# Patient Record
Sex: FEMALE | Race: WHITE
[De-identification: names, ages, dates, MRNs, and addresses within clinical notes are randomized per-mention and may not be internally consistent; named-entity substitution may affect disease eponyms.]

---

## 2020-01-06 ENCOUNTER — HOSPITAL ENCOUNTER (EMERGENCY)
Dept: HOSPITAL 74 - JER | Age: 35
LOS: 1 days | Discharge: HOME | End: 2020-01-07
Payer: COMMERCIAL

## 2020-01-06 VITALS — TEMPERATURE: 98.2 F | DIASTOLIC BLOOD PRESSURE: 76 MMHG | HEART RATE: 88 BPM | SYSTOLIC BLOOD PRESSURE: 117 MMHG

## 2020-01-06 VITALS — BODY MASS INDEX: 27.4 KG/M2
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
## 2020-01-06 DIAGNOSIS — F10.920: Primary | ICD-10-CM

## 2020-01-07 NOTE — PDOC
*Physical Exam





- Vital Signs


 Last Vital Signs











Temp Pulse Resp BP Pulse Ox


 


 98.2 F   88   15   117/76   96 


 


 01/06/20 23:53  01/06/20 23:53  01/06/20 23:53  01/06/20 23:53  01/06/20 23:53














Medical Decision Making





- Medical Decision Making





01/07/20 00:16


Patient seen by the advanced practice provider under my direct supervision. 

Ancillary testing reviewed as necessary.


I agree with plan as outlined by the advanced practice provider.





Discharge





- Discharge Information


Problems reviewed: Yes


Clinical Impression/Diagnosis: 


Alcohol intoxication


Qualifiers:


 Complication of substance-induced condition: uncomplicated Qualified Code(s): 

F10.920 - Alcohol use, unspecified with intoxication, uncomplicated





Condition: Fair


Disposition: HOME





- Additional Discharge Information


Prescriptions: 


Ondansetron [Zofran -] 4 mg PO TID PRN #21 tablet


 PRN Reason: Nausea And/Or Vomiting





- Follow up/Referral





- Patient Discharge Instructions


Patient Printed Discharge Instructions:  [facility] for Alcohol Abuse


Additional Instructions: 


Drink plenty of nonalcoholic fluids.


Avoid alcoholic beverages.


Should you desire detox and/or rehabilitation these go to [address] [place] [address] for treatment.


Return to the emergency department for any new or worsening symptoms.


It was a pleasure taking care of you today.  Thank you very much for choosing 

us to provide your emergent health care needs.





- Post Discharge Activity

## 2020-01-07 NOTE — PDOC
History of Present Illness





- General


Chief Complaint: Alcohol intoxication


Stated Complaint: VOMITING/HEADACHE/NAUSEA


Time Seen by Provider: 01/07/20 00:12


History Source: Patient


Exam Limitations: No Limitations





- History of Present Illness


Initial Comments: 





01/07/20 00:48





HISTORY OF PRESENT ILLNESS: 34-year-old woman past medical history of alcohol 

abuse presents emergency department with alcohol intoxication.  Patient reports 

she had 1 week of alcohol abstinence followed by heavy alcohol consumption 

tonight.  Patient reports feeling nauseous.  Patient endorses multiple life 

stressors that lead to her alcohol use.  She reports multiple detox and rehab 

admissions with poor follow-through as an outpatient.  She denies any chest pain

, dizziness, shortness of breath.





No recent travel or sick contacts. 


PAST MEDICAL HISTORY: Denies past medical history


SURGICAL HISTORY: Denies


ALLERGIES: No known drug allergies





REVIEW OF SYSTEMS


General/Constitutional: Denies fever or chills. Denies weakness, weight change.


HEENT: Denies change in vision. Denies ear pain or discharge. Denies sore 

throat.


Cardiovascular: Denies chest pain or shortness of breath.


Respiratory: Denies cough, wheezing, or hemoptysis.


Gastrointestinal: Denies nausea, vomiting, diarrhea or constipation. Denies 

rectal bleeding.


Genitourinary: Denies dysuria, frequency, or change in urination.


Musculoskeletal: Denies joint or muscle swelling or pain. Denies neck or back 

pain.


Skin and breasts: Denies rash or easy bruising.


Neurologic: Denies headache, vertigo, loss of consciousness, or loss of 

sensation.


Psychiatric: Denies depression or anxiety.


Endocrine: Denies increased thirst. Denies abnormal weight change.


Hematologic/Lymphatic: Denies anemia, easy bleeding, or history of blood clots.


Allergic/Immunologic: Denies hives or skin allergy. Denies latex allergy.











PHYSICAL EXAM


General Appearance: Well-appearing, appropriately dressed.  No apparent 

distress. (+)intoxication.


HEENT: EOMI, PERRLA, normal ENT inspection, normal voice, TMs normal, pharynx 

normal.  No conjunctival pallor.  No photophobia, scleral icterus.


Neck: Supple.  Trachea midline. No tenderness, rigidity, carotid bruit, stridor

, lymphadenopathy, or thyromegaly. 


Respiratory/Chest: Lungs CTAB.  No shortness of breath, chest tenderness, 

respiratory distress, accessory muscle use. No crackles, rales, rhonchi, stridor

, wheezing, dullness


Cardiovascular: RRR. S1, S2.  No JVD, murmur, bradycardia, tachycardia.


Vascular Pulses: Dorsalis-Pedis (R): 2+, Dorsalis-Pedis (L): 2+


Gastrointestinal/Abdominal: Normal bowel sounds. Abdomen soft, non-distended.  

No tenderness or rebound tenderness. No organomegaly, pulsatile mass, guarding, 

hernia, hepatomegaly, splenomegaly.


Lymphatic: No adenopathy, tenderness.


Musculoskeletal/Extremities:  Normal inspection. FROM of all extremities, 

normal capillary refill.  Pelvis Stable.  No CVA tenderness. No tenderness to 

extremities, pedal edema, swelling, erythema or deformity.


Integumentary: Appropriate color, dry, warm.  No cyanosis, erythema, jaundice 

or rash


Neurologic: CNs II-XII intact. Fully oriented, alert.  Appropriate mood/affect. 

Motor strength 5/5.  No appreciable EOM palsy, facial droop or sensory deficit.





Past History





- Past Medical History


Allergies/Adverse Reactions: 


 Allergies











Allergy/AdvReac Type Severity Reaction Status Date / Time


 


No Known Allergies Allergy   Verified 01/06/20 23:55











Home Medications: 


Ambulatory Orders





Ondansetron [Zofran -] 4 mg PO TID PRN #21 tablet 01/07/20 








COPD: No





- Psycho Social/Smoking Cessation Hx


Smoking History: Current some day smoker


Have you smoked in the past 12 months: Yes


Number of Cigarettes Smoked Daily: 2


Information on smoking cessation initiated: No


Hx Alcohol Use: Yes


Drug/Substance Use Hx: No





*Physical Exam





- Vital Signs


 Last Vital Signs











Temp Pulse Resp BP Pulse Ox


 


 98.2 F   88   15   117/76   96 


 


 01/06/20 23:53  01/06/20 23:53  01/06/20 23:53  01/06/20 23:53  01/06/20 23:53














Medical Decision Making





- Medical Decision Making





01/07/20 00:48


A/P:


34-year-old woman with acute alcohol intoxication





Patient is alert and oriented x3 with steady gait


Patient has a friend with her who has not been drinking tonight and has 

accepted responsibility to transport the patient home.


Zofran 4 mg sublingual now


Discharge patient home with instructions to follow-up with [facility] should she 

desire detoxification.





I discussed the physical exam findings, ancillary test results and final 

diagnoses with the patient. I answered all of the patient's questions. The 

patient was satisfied with the care received and felt comfortable with the 

discharge plan and treatment plan. The patient will call their primary care 

physician within 24 hours to arrange follow-up and will return to the Emergency 

Department with any new, persistent or worsening symptoms.


01/07/20 00:50








Discharge





- Discharge Information


Problems reviewed: Yes


Clinical Impression/Diagnosis: 


Alcohol intoxication


Qualifiers:


 Complication of substance-induced condition: uncomplicated Qualified Code(s): 

F10.920 - Alcohol use, unspecified with intoxication, uncomplicated





Condition: Fair


Disposition: HOME





- Admission


No





- Additional Discharge Information


Prescriptions: 


Ondansetron [Zofran -] 4 mg PO TID PRN #21 tablet


 PRN Reason: Nausea And/Or Vomiting





- Follow up/Referral





- Patient Discharge Instructions


Patient Printed Discharge Instructions:  [facility] for Alcohol Abuse


Additional Instructions: 


Drink plenty of nonalcoholic fluids.


Avoid alcoholic beverages.


Should you desire detox and/or rehabilitation these go to [address] [place] [address] for treatment.


Return to the emergency department for any new or worsening symptoms.


It was a pleasure taking care of you today.  Thank you very much for choosing 

us to provide your emergent health care needs.





- Post Discharge Activity